# Patient Record
Sex: MALE | Race: OTHER | HISPANIC OR LATINO | Employment: FULL TIME | URBAN - METROPOLITAN AREA
[De-identification: names, ages, dates, MRNs, and addresses within clinical notes are randomized per-mention and may not be internally consistent; named-entity substitution may affect disease eponyms.]

---

## 2018-07-04 NOTE — PROGRESS NOTES
7/5/2018    [id number] [address]y   1955  [id number]    Discussion and Plan    Etiology of hematospermia discussed at length which is typically a benign and self-limiting condition  Examination is otherwise unremarkable  He will return in 1 year with PSA prior to visit  All questions answered at this time  1  Hematospermia  - PSA Total, Diagnostic; Future    Assessment      Patient Active Problem List   Diagnosis    Hematospermia       History of Present Illness    Surinder Bartholomew is a 65 y o  male seen today in regards to a history of hematospermia  He had a single episode following relations of a brownish discolored semen which had persisted for short time  No prior such episodes  He denies any urinary complaints noting good flow with complete bladder emptying and nocturia times 1-2  No history of hematuria urinary tract infection  The patient is an avid cyclist   States PSAs otherwise been normal previously  I have no records available for review  Urinary Symptom Assessment        Past Medical History  Past Medical History:   Diagnosis Date    Herniated disc, cervical        Past Social History  History reviewed  No pertinent surgical history  Past Family History  Family History   Problem Relation Age of Onset    Diabetes Father        Past Social history  Social History     Social History    Marital status: /Civil Union     Spouse name: N/A    Number of children: N/A    Years of education: N/A     Occupational History          Social History Main Topics    Smoking status: Former Smoker    Smokeless tobacco: Never Used    Alcohol use No    Drug use: No    Sexual activity: Not on file     Other Topics Concern    Not on file     Social History Narrative    No narrative on file       Current Medications  No current outpatient prescriptions on file  No current facility-administered medications for this visit          Allergies  Allergies   Allergen Reactions    Clotrimazole Rash Past Medical History, Social History, Family History, medications and allergies were reviewed  Review of Systems  Review of Systems   Constitutional: Negative  HENT: Negative  Eyes: Negative  Respiratory: Negative  Cardiovascular: Negative  Gastrointestinal: Negative  Endocrine: Negative  Genitourinary: Negative for decreased urine volume, difficulty urinating, frequency, hematuria and urgency  Musculoskeletal: Negative  Skin: Negative  Neurological: Negative  Hematological: Negative  Psychiatric/Behavioral: Negative  Vitals  Vitals:    07/05/18 1259   BP: 120/90   Pulse: 80   Weight: 88 9 kg (196 lb)   Height: 5' 8" (1 727 m)         Physical Exam    Physical Exam   Constitutional: He is oriented to person, place, and time  He appears well-developed and well-nourished  HENT:   Head: Normocephalic and atraumatic  Eyes: Pupils are equal, round, and reactive to light  Neck: Normal range of motion  Cardiovascular: Normal rate, regular rhythm and normal heart sounds  Pulmonary/Chest: Effort normal and breath sounds normal  No accessory muscle usage  No respiratory distress  Abdominal: Soft  Normal appearance and bowel sounds are normal  There is no tenderness  Genitourinary: Rectum normal, prostate normal and penis normal  No penile tenderness  Genitourinary Comments: Prostate approximately 40 g and smooth  No nodules   Musculoskeletal: Normal range of motion  Neurological: He is alert and oriented to person, place, and time  Skin: Skin is warm, dry and intact  Psychiatric: He has a normal mood and affect  His speech is normal  Cognition and memory are normal    Nursing note and vitals reviewed        Results    Below listed labs, pathology results, and radiology images were personally reviewed:    No results found for: PSA  No results found for: GLUCOSE, CALCIUM, NA, K, CO2, CL, BUN, CREATININE  No results found for: WBC, HGB, HCT, MCV, PLT    No results found for this or any previous visit (from the past 1 hour(s)) ]

## 2018-07-05 ENCOUNTER — OFFICE VISIT (OUTPATIENT)
Dept: UROLOGY | Facility: AMBULATORY SURGERY CENTER | Age: 63
End: 2018-07-05
Payer: COMMERCIAL

## 2018-07-05 VITALS
SYSTOLIC BLOOD PRESSURE: 120 MMHG | HEIGHT: 68 IN | HEART RATE: 80 BPM | BODY MASS INDEX: 29.7 KG/M2 | WEIGHT: 196 LBS | DIASTOLIC BLOOD PRESSURE: 90 MMHG

## 2018-07-05 DIAGNOSIS — R36.1 HEMATOSPERMIA: Primary | ICD-10-CM

## 2018-07-05 PROCEDURE — 99243 OFF/OP CNSLTJ NEW/EST LOW 30: CPT | Performed by: UROLOGY

## 2019-03-07 ENCOUNTER — TELEPHONE (OUTPATIENT)
Dept: UROLOGY | Facility: AMBULATORY SURGERY CENTER | Age: 64
End: 2019-03-07

## 2019-03-07 NOTE — TELEPHONE ENCOUNTER
Patient is experiencing hematospermia again and would like a call back to discuss or possibly get an emergency appointment  Please call

## 2019-03-07 NOTE — TELEPHONE ENCOUNTER
Spoke with patient and offered to schedule him with OLESYA Velasquez on 3/11/19  Patient refuses to see anyone other than physician  Attempted to explain to patient hematospermia is usually a benign and self-limiting condition as Dr Desirae Shepard explained to patient also last year, but patient still insisting on appt with Dr Dorie Cardona patient will have to look at schedule and call him back  It may be several weeks before he can see Dr Desirae Shepard  Patient will await call back

## 2019-03-07 NOTE — TELEPHONE ENCOUNTER
Called patient back to offer appt with Dr Nissa Santamaria 4/15/19 8:45 @ [place] office  Patient states that is too long to wait  Advised patient he was offered a sooner appt with [facility] but he refused  Attempted to explain to patient again that [facility] sees patients and treats them in office as MD are tied up in the OR most of the time  Patient accepted appt with Dr Nissa Santamaria 4/15 and will call back if he gets in sooner elsewhere

## 2019-03-14 ENCOUNTER — TRANSCRIBE ORDERS (OUTPATIENT)
Dept: LAB | Facility: CLINIC | Age: 64
End: 2019-03-14

## 2019-03-14 ENCOUNTER — APPOINTMENT (OUTPATIENT)
Dept: LAB | Facility: CLINIC | Age: 64
End: 2019-03-14
Payer: COMMERCIAL

## 2019-03-14 ENCOUNTER — OFFICE VISIT (OUTPATIENT)
Dept: LAB | Facility: CLINIC | Age: 64
End: 2019-03-14
Payer: COMMERCIAL

## 2019-03-14 DIAGNOSIS — M75.101 ROTATOR CUFF SYNDROME OF RIGHT SHOULDER: Primary | ICD-10-CM

## 2019-03-14 DIAGNOSIS — M75.101 ROTATOR CUFF SYNDROME OF RIGHT SHOULDER: ICD-10-CM

## 2019-03-14 LAB
ANION GAP SERPL CALCULATED.3IONS-SCNC: 9 MMOL/L (ref 4–13)
BASOPHILS # BLD AUTO: 0.05 THOUSANDS/ΜL (ref 0–0.1)
BASOPHILS NFR BLD AUTO: 1 % (ref 0–1)
BUN SERPL-MCNC: 20 MG/DL (ref 5–25)
CALCIUM SERPL-MCNC: 8.9 MG/DL (ref 8.3–10.1)
CHLORIDE SERPL-SCNC: 105 MMOL/L (ref 100–108)
CO2 SERPL-SCNC: 27 MMOL/L (ref 21–32)
CREAT SERPL-MCNC: 1.23 MG/DL (ref 0.6–1.3)
EOSINOPHIL # BLD AUTO: 0.13 THOUSAND/ΜL (ref 0–0.61)
EOSINOPHIL NFR BLD AUTO: 2 % (ref 0–6)
ERYTHROCYTE [DISTWIDTH] IN BLOOD BY AUTOMATED COUNT: 12.9 % (ref 11.6–15.1)
GFR SERPL CREATININE-BSD FRML MDRD: 63 ML/MIN/1.73SQ M
GLUCOSE SERPL-MCNC: 91 MG/DL (ref 65–140)
HCT VFR BLD AUTO: 45.1 % (ref 36.5–49.3)
HGB BLD-MCNC: 15.7 G/DL (ref 12–17)
IMM GRANULOCYTES # BLD AUTO: 0.02 THOUSAND/UL (ref 0–0.2)
IMM GRANULOCYTES NFR BLD AUTO: 0 % (ref 0–2)
LYMPHOCYTES # BLD AUTO: 1.96 THOUSANDS/ΜL (ref 0.6–4.47)
LYMPHOCYTES NFR BLD AUTO: 31 % (ref 14–44)
MCH RBC QN AUTO: 29.9 PG (ref 26.8–34.3)
MCHC RBC AUTO-ENTMCNC: 34.8 G/DL (ref 31.4–37.4)
MCV RBC AUTO: 86 FL (ref 82–98)
MONOCYTES # BLD AUTO: 0.45 THOUSAND/ΜL (ref 0.17–1.22)
MONOCYTES NFR BLD AUTO: 7 % (ref 4–12)
NEUTROPHILS # BLD AUTO: 3.65 THOUSANDS/ΜL (ref 1.85–7.62)
NEUTS SEG NFR BLD AUTO: 59 % (ref 43–75)
NRBC BLD AUTO-RTO: 0 /100 WBCS
PLATELET # BLD AUTO: 231 THOUSANDS/UL (ref 149–390)
PMV BLD AUTO: 10 FL (ref 8.9–12.7)
POTASSIUM SERPL-SCNC: 4 MMOL/L (ref 3.5–5.3)
RBC # BLD AUTO: 5.25 MILLION/UL (ref 3.88–5.62)
SODIUM SERPL-SCNC: 141 MMOL/L (ref 136–145)
WBC # BLD AUTO: 6.26 THOUSAND/UL (ref 4.31–10.16)

## 2019-03-14 PROCEDURE — 85025 COMPLETE CBC W/AUTO DIFF WBC: CPT

## 2019-03-14 PROCEDURE — 36415 COLL VENOUS BLD VENIPUNCTURE: CPT

## 2019-03-14 PROCEDURE — 93005 ELECTROCARDIOGRAM TRACING: CPT

## 2019-03-14 PROCEDURE — 80048 BASIC METABOLIC PNL TOTAL CA: CPT

## 2019-03-15 LAB
ATRIAL RATE: 68 BPM
P AXIS: 13 DEGREES
PR INTERVAL: 138 MS
QRS AXIS: -1 DEGREES
QRSD INTERVAL: 82 MS
QT INTERVAL: 402 MS
QTC INTERVAL: 427 MS
T WAVE AXIS: 37 DEGREES
VENTRICULAR RATE: 68 BPM

## 2019-03-15 PROCEDURE — 93010 ELECTROCARDIOGRAM REPORT: CPT | Performed by: INTERNAL MEDICINE

## 2019-04-15 ENCOUNTER — OFFICE VISIT (OUTPATIENT)
Dept: UROLOGY | Facility: AMBULATORY SURGERY CENTER | Age: 64
End: 2019-04-15
Payer: COMMERCIAL

## 2019-04-15 VITALS
HEIGHT: 68 IN | HEART RATE: 81 BPM | BODY MASS INDEX: 29.7 KG/M2 | DIASTOLIC BLOOD PRESSURE: 80 MMHG | WEIGHT: 196 LBS | SYSTOLIC BLOOD PRESSURE: 134 MMHG

## 2019-04-15 DIAGNOSIS — R36.1 HEMATOSPERMIA: Primary | ICD-10-CM

## 2019-04-15 PROCEDURE — 99214 OFFICE O/P EST MOD 30 MIN: CPT | Performed by: UROLOGY

## 2019-04-17 ENCOUNTER — APPOINTMENT (OUTPATIENT)
Dept: LAB | Facility: CLINIC | Age: 64
End: 2019-04-17
Payer: COMMERCIAL

## 2019-04-17 DIAGNOSIS — R36.1 HEMATOSPERMIA: ICD-10-CM

## 2019-04-17 LAB — PSA SERPL-MCNC: 0.3 NG/ML (ref 0–4)

## 2019-04-17 PROCEDURE — 84153 ASSAY OF PSA TOTAL: CPT

## 2020-04-09 ENCOUNTER — TELEPHONE (OUTPATIENT)
Dept: UROLOGY | Facility: AMBULATORY SURGERY CENTER | Age: 65
End: 2020-04-09

## 2023-06-30 ENCOUNTER — APPOINTMENT (EMERGENCY)
Dept: RADIOLOGY | Facility: HOSPITAL | Age: 68
End: 2023-06-30
Payer: COMMERCIAL

## 2023-06-30 ENCOUNTER — HOSPITAL ENCOUNTER (EMERGENCY)
Facility: HOSPITAL | Age: 68
Discharge: HOME | End: 2023-06-30
Attending: EMERGENCY MEDICINE
Payer: COMMERCIAL

## 2023-06-30 VITALS
OXYGEN SATURATION: 95 % | TEMPERATURE: 98.3 F | HEART RATE: 71 BPM | RESPIRATION RATE: 19 BRPM | SYSTOLIC BLOOD PRESSURE: 129 MMHG | DIASTOLIC BLOOD PRESSURE: 77 MMHG

## 2023-06-30 DIAGNOSIS — R00.2 PALPITATIONS: ICD-10-CM

## 2023-06-30 DIAGNOSIS — R42 DIZZINESS: Primary | ICD-10-CM

## 2023-06-30 LAB
ALBUMIN SERPL BCP-MCNC: 4.4 G/DL (ref 3.5–5)
ALP SERPL-CCNC: 57 U/L (ref 34–104)
ALT SERPL W P-5'-P-CCNC: 19 U/L (ref 7–52)
ANION GAP SERPL CALCULATED.3IONS-SCNC: 9 MMOL/L
AST SERPL W P-5'-P-CCNC: 16 U/L (ref 13–39)
ATRIAL RATE: 86 BPM
BASOPHILS # BLD AUTO: 0.04 THOUSANDS/ÂΜL (ref 0–0.1)
BASOPHILS NFR BLD AUTO: 1 % (ref 0–1)
BILIRUB SERPL-MCNC: 0.7 MG/DL (ref 0.2–1)
BUN SERPL-MCNC: 19 MG/DL (ref 5–25)
CALCIUM SERPL-MCNC: 9.3 MG/DL (ref 8.4–10.2)
CARDIAC TROPONIN I PNL SERPL HS: 2 NG/L
CHLORIDE SERPL-SCNC: 106 MMOL/L (ref 96–108)
CO2 SERPL-SCNC: 25 MMOL/L (ref 21–32)
CREAT SERPL-MCNC: 1.02 MG/DL (ref 0.6–1.3)
D DIMER PPP FEU-MCNC: <0.27 UG/ML FEU
EOSINOPHIL # BLD AUTO: 0.03 THOUSAND/ÂΜL (ref 0–0.61)
EOSINOPHIL NFR BLD AUTO: 0 % (ref 0–6)
ERYTHROCYTE [DISTWIDTH] IN BLOOD BY AUTOMATED COUNT: 12.3 % (ref 11.6–15.1)
GFR SERPL CREATININE-BSD FRML MDRD: 76 ML/MIN/1.73SQ M
GLUCOSE SERPL-MCNC: 133 MG/DL (ref 65–140)
HCT VFR BLD AUTO: 46.9 % (ref 36.5–49.3)
HGB BLD-MCNC: 16.7 G/DL (ref 12–17)
IMM GRANULOCYTES # BLD AUTO: 0.04 THOUSAND/UL (ref 0–0.2)
IMM GRANULOCYTES NFR BLD AUTO: 1 % (ref 0–2)
LYMPHOCYTES # BLD AUTO: 1.14 THOUSANDS/ÂΜL (ref 0.6–4.47)
LYMPHOCYTES NFR BLD AUTO: 13 % (ref 14–44)
MAGNESIUM SERPL-MCNC: 2 MG/DL (ref 1.9–2.7)
MCH RBC QN AUTO: 30.4 PG (ref 26.8–34.3)
MCHC RBC AUTO-ENTMCNC: 35.6 G/DL (ref 31.4–37.4)
MCV RBC AUTO: 85 FL (ref 82–98)
MONOCYTES # BLD AUTO: 0.37 THOUSAND/ÂΜL (ref 0.17–1.22)
MONOCYTES NFR BLD AUTO: 4 % (ref 4–12)
NEUTROPHILS # BLD AUTO: 6.87 THOUSANDS/ÂΜL (ref 1.85–7.62)
NEUTS SEG NFR BLD AUTO: 81 % (ref 43–75)
NRBC BLD AUTO-RTO: 0 /100 WBCS
P AXIS: 50 DEGREES
PLATELET # BLD AUTO: 206 THOUSANDS/UL (ref 149–390)
PMV BLD AUTO: 9.7 FL (ref 8.9–12.7)
POTASSIUM SERPL-SCNC: 3.8 MMOL/L (ref 3.5–5.3)
PR INTERVAL: 160 MS
PROT SERPL-MCNC: 7 G/DL (ref 6.4–8.4)
QRS AXIS: 15 DEGREES
QRSD INTERVAL: 90 MS
QT INTERVAL: 404 MS
QTC INTERVAL: 483 MS
RBC # BLD AUTO: 5.5 MILLION/UL (ref 3.88–5.62)
SODIUM SERPL-SCNC: 140 MMOL/L (ref 135–147)
T WAVE AXIS: 55 DEGREES
TSH SERPL DL<=0.05 MIU/L-ACNC: 1.43 UIU/ML (ref 0.45–4.5)
VENTRICULAR RATE: 86 BPM
WBC # BLD AUTO: 8.49 THOUSAND/UL (ref 4.31–10.16)

## 2023-06-30 PROCEDURE — 71046 X-RAY EXAM CHEST 2 VIEWS: CPT

## 2023-06-30 PROCEDURE — 85025 COMPLETE CBC W/AUTO DIFF WBC: CPT

## 2023-06-30 PROCEDURE — 99285 EMERGENCY DEPT VISIT HI MDM: CPT

## 2023-06-30 PROCEDURE — 83735 ASSAY OF MAGNESIUM: CPT

## 2023-06-30 PROCEDURE — 99285 EMERGENCY DEPT VISIT HI MDM: CPT | Performed by: EMERGENCY MEDICINE

## 2023-06-30 PROCEDURE — 84484 ASSAY OF TROPONIN QUANT: CPT

## 2023-06-30 PROCEDURE — 36415 COLL VENOUS BLD VENIPUNCTURE: CPT

## 2023-06-30 PROCEDURE — 84443 ASSAY THYROID STIM HORMONE: CPT

## 2023-06-30 PROCEDURE — 93005 ELECTROCARDIOGRAM TRACING: CPT

## 2023-06-30 PROCEDURE — 80053 COMPREHEN METABOLIC PANEL: CPT

## 2023-06-30 PROCEDURE — 85379 FIBRIN DEGRADATION QUANT: CPT

## 2023-06-30 RX ORDER — MECLIZINE HYDROCHLORIDE 25 MG/1
25 TABLET ORAL 3 TIMES DAILY PRN
Qty: 30 TABLET | Refills: 0 | Status: SHIPPED | OUTPATIENT
Start: 2023-06-30 | End: 2023-07-10

## 2023-06-30 RX ORDER — MECLIZINE HCL 12.5 MG/1
25 TABLET ORAL ONCE
Status: COMPLETED | OUTPATIENT
Start: 2023-06-30 | End: 2023-06-30

## 2023-06-30 RX ADMIN — MECLIZINE 25 MG: 12.5 TABLET ORAL at 12:30

## 2023-06-30 NOTE — ED ATTENDING ATTESTATION
6/30/2023  IJocelyne DO, saw and evaluated the patient  I have discussed the patient with the resident/non-physician practitioner and agree with the resident's/non-physician practitioner's findings, Plan of Care, and MDM as documented in the resident's/non-physician practitioner's note, except where noted  All available labs and Radiology studies were reviewed  I was present for key portions of any procedure(s) performed by the resident/non-physician practitioner and I was immediately available to provide assistance  At this point I agree with the current assessment done in the Emergency Department  I have conducted an independent evaluation of this patient a history and physical is as follows:    ED Course   78year old male presents for evaluation of 2 episodes of dizziness, diaphoresis, and rapid heart rate  Patient reports room spinning type dizziness shortly after waking up 5 days ago - it was associated with diaphoresis, nausea, feeling off balance and rapid heart rate and felt it was difficult to get a breath  No chest pain but felt fluttering  He had a second episode this am lasting a short time  He reports that movement of his head aggravates the symptoms  He also felt slightly off balance when ambulating  He has felt a sensation of head pressure this week  He does report a remote history of vertigo like symptoms and recalls feeling similar many years ago  PmhX: chronic tinnitus, remote hx of vertigo, hermatospermia    PE: Vital signs are stable, afebrile, no diaphoresis noted  Pupils are equal and reactive  There is fatigable left and right lateral nystagmus  TMs are clear bilateral   Neck is supple, nontender, normal range of motion  Heart is regular rate and rhythm without ectopy  Lungs are clear to auscultation  Abdomen is soft, nontender, nondistended with normal active bowel sounds  No lower extremity edema noted  5-5 strength in all 4 extremities    Gait steady    Assessment: 79-year-old male presents with recurrent episode of dizziness described as room spinning, rapid palpitations and diaphoresis  Differential diagnosis includes but is not limited to acute benign positional vertigo, cardiac dysrhythmia, myocardial ischemia or infarction, electrolyte abnormality, thyroid disorder    Plan: We will check EKG, electrolytes, trial Antivert for symptom relief, ambulate patient and reassess  Update: Patient's work-up is unremarkable and reassuring  He feels better after receiving Antivert and was able to ambulate without difficulty  Suspect vertigo as etiology of symptoms  Will recommend that he follow-up with cardiology for evaluation of rapid palpitations  We discussed signs and symptoms to return to the emergency department      Critical Care Time  Procedures

## 2023-06-30 NOTE — ED PROVIDER NOTES
History  Chief Complaint   Patient presents with   • Dizziness     On Monday, had episode of dizziness and diaphoresis  Resolved spontaneously  Today woke up and had same sensation of dizziness, sweating, palpations  -CP  Also complaining of bitter taste in mouth  This is a 42-year-old male presenting to the emergency department for evaluation of diaphoresis, dizziness, palpitations  Patient reports he has a history of vertigo in the past   He used to be active approximately 2 years ago but stopped  Patient reports that on Monday, 4 days ago patient woke up diaphoretic, dizzy, with palpitations  He states he for felt like his heart was going very fast and potentially skipping beats  He denies having this in the past though, as previously mentioned, he does have a history of vertigo  He states this went away shortly thereafter and he was able to resume his activities of daily living  He went without event Tuesday, Wednesday, Thursday  Today patient woke up and he had another episode of dizziness, diaphoresis, palpitations  This episode terminated shortly after beginning and given that this was the second episode in a week patient is presenting for further evaluation  None       Past Medical History:   Diagnosis Date   • Herniated disc, cervical        Past Surgical History:   Procedure Laterality Date   • SHOULDER SURGERY         Family History   Problem Relation Age of Onset   • Diabetes Father      I have reviewed and agree with the history as documented  E-Cigarette/Vaping   • E-Cigarette Use Never User      E-Cigarette/Vaping Substances     Social History     Tobacco Use   • Smoking status: Former   • Smokeless tobacco: Never   Vaping Use   • Vaping Use: Never used   Substance Use Topics   • Alcohol use: Not Currently     Comment: beer on occ   • Drug use: No        Review of Systems   Constitutional: Positive for diaphoresis  HENT: Negative  Eyes: Negative  Respiratory: Negative  Cardiovascular: Positive for palpitations  Gastrointestinal: Negative  Endocrine: Negative  Genitourinary: Negative  Musculoskeletal: Negative  Skin: Negative  Allergic/Immunologic: Negative  Neurological: Positive for dizziness  Hematological: Negative  Psychiatric/Behavioral: Negative  All other systems reviewed and are negative  Physical Exam  ED Triage Vitals   Temperature Pulse Respirations Blood Pressure SpO2   06/30/23 1112 06/30/23 1105 06/30/23 1105 06/30/23 1105 06/30/23 1105   98 3 °F (36 8 °C) 82 18 (!) 171/92 94 %      Temp Source Heart Rate Source Patient Position - Orthostatic VS BP Location FiO2 (%)   06/30/23 1112 06/30/23 1105 06/30/23 1200 06/30/23 1200 --   Oral Monitor Lying Right arm       Pain Score       --                    Orthostatic Vital Signs  Vitals:    06/30/23 1105 06/30/23 1200 06/30/23 1230 06/30/23 1300   BP: (!) 171/92 138/81 134/81 129/77   Pulse: 82 79 76 71   Patient Position - Orthostatic VS:  Lying Lying        Physical Exam  Vitals and nursing note reviewed  Constitutional:       General: He is not in acute distress  Appearance: Normal appearance  He is not ill-appearing, toxic-appearing or diaphoretic  HENT:      Head: Normocephalic and atraumatic  Eyes:      General: No scleral icterus  Right eye: No discharge  Left eye: No discharge  Extraocular Movements: Extraocular movements intact  Conjunctiva/sclera: Conjunctivae normal       Pupils: Pupils are equal, round, and reactive to light  Comments: Fatigable nystagmus with rapid leftward and rightward gaze   Cardiovascular:      Rate and Rhythm: Normal rate  Pulses: Normal pulses  Heart sounds: Normal heart sounds  No murmur heard  No friction rub  No gallop  Pulmonary:      Effort: Pulmonary effort is normal  No respiratory distress  Breath sounds: Normal breath sounds  No stridor  No wheezing, rhonchi or rales     Abdominal: General: Abdomen is flat  Bowel sounds are normal  There is no distension  Palpations: Abdomen is soft  Tenderness: There is no abdominal tenderness  There is no guarding or rebound  Musculoskeletal:         General: No swelling  Normal range of motion  Cervical back: Normal range of motion  No rigidity  Right lower leg: No edema  Left lower leg: No edema  Skin:     General: Skin is warm and dry  Capillary Refill: Capillary refill takes less than 2 seconds  Coloration: Skin is not jaundiced  Findings: No bruising or lesion  Neurological:      General: No focal deficit present  Mental Status: He is alert and oriented to person, place, and time  Mental status is at baseline  Comments: Patient ambulatory in emergency department   Psychiatric:         Mood and Affect: Mood normal          Behavior: Behavior normal          Thought Content: Thought content normal          Judgment: Judgment normal          ED Medications  Medications   meclizine (ANTIVERT) tablet 25 mg (25 mg Oral Given 6/30/23 1230)       Diagnostic Studies  Results Reviewed     Procedure Component Value Units Date/Time    TSH, 3rd generation with Free T4 reflex [[id number]]  (Normal) Collected: 06/30/23 1200    Lab Status: Final result Specimen: Blood from Arm, Left Updated: 06/30/23 1243     TSH 3RD GENERATON 1 426 uIU/mL     D-dimer, quantitative [[id number]]  (Normal) Collected: 06/30/23 1200    Lab Status: Final result Specimen: Blood from Arm, Left Updated: 06/30/23 1237     D-Dimer, Quant <0 27 ug/ml FEU     Narrative: In the evaluation for possible pulmonary embolism, in the appropriate (Well's Score of 4 or less) patient, the age adjusted d-dimer cutoff for this patient can be calculated as:    Age x 0 01 (in ug/mL) for Age-adjusted D-dimer exclusion threshold for a patient over 50 years      HS Troponin 0hr (reflex protocol) [[id number]]  (Normal) Collected: 06/30/23 1200    Lab Status: Final result Specimen: Blood from Arm, Left Updated: 06/30/23 1233     hs TnI 0hr 2 ng/L     Comprehensive metabolic panel [[id number]] Collected: 06/30/23 1200    Lab Status: Final result Specimen: Blood from Arm, Left Updated: 06/30/23 1226     Sodium 140 mmol/L      Potassium 3 8 mmol/L      Chloride 106 mmol/L      CO2 25 mmol/L      ANION GAP 9 mmol/L      BUN 19 mg/dL      Creatinine 1 02 mg/dL      Glucose 133 mg/dL      Calcium 9 3 mg/dL      AST 16 U/L      ALT 19 U/L      Alkaline Phosphatase 57 U/L      Total Protein 7 0 g/dL      Albumin 4 4 g/dL      Total Bilirubin 0 70 mg/dL      eGFR 76 ml/min/1 73sq m     Narrative:      [place] guidelines for Chronic Kidney Disease (CKD):   •  Stage 1 with normal or high GFR (GFR > 90 mL/min/1 73 square meters)  •  Stage 2 Mild CKD (GFR = 60-89 mL/min/1 73 square meters)  •  Stage 3A Moderate CKD (GFR = 45-59 mL/min/1 73 square meters)  •  Stage 3B Moderate CKD (GFR = 30-44 mL/min/1 73 square meters)  •  Stage 4 Severe CKD (GFR = 15-29 mL/min/1 73 square meters)  •  Stage 5 End Stage CKD (GFR <15 mL/min/1 73 square meters)  Note: GFR calculation is accurate only with a steady state creatinine    Magnesium [[id number]]  (Normal) Collected: 06/30/23 1200    Lab Status: Final result Specimen: Blood from Arm, Left Updated: 06/30/23 1226     Magnesium 2 0 mg/dL     CBC and differential [[id number]]  (Abnormal) Collected: 06/30/23 1200    Lab Status: Final result Specimen: Blood from Arm, Left Updated: 06/30/23 1212     WBC 8 49 Thousand/uL      RBC 5 50 Million/uL      Hemoglobin 16 7 g/dL      Hematocrit 46 9 %      MCV 85 fL      MCH 30 4 pg      MCHC 35 6 g/dL      RDW 12 3 %      MPV 9 7 fL      Platelets 391 Thousands/uL      nRBC 0 /100 WBCs      Neutrophils Relative 81 %      Immat GRANS % 1 %      Lymphocytes Relative 13 %      Monocytes Relative 4 %      Eosinophils Relative 0 %      Basophils Relative 1 %      Neutrophils "Absolute 6 87 Thousands/µL      Immature Grans Absolute 0 04 Thousand/uL      Lymphocytes Absolute 1 14 Thousands/µL      Monocytes Absolute 0 37 Thousand/µL      Eosinophils Absolute 0 03 Thousand/µL      Basophils Absolute 0 04 Thousands/µL                  XR chest 2 views   ED Interpretation by Jl No DO (06/30 1149)   No infiltrate, effusion, pneumothorax      Final Result by Feliciano Gross MD (06/30 1159)      No acute cardiopulmonary disease  Workstation performed: [id number]               Procedures  ECG 12 Lead Documentation Only    Date/Time: 7/1/2023 9:38 PM    Performed by: Jl No DO  Authorized by: Jl No DO    Indications / Diagnosis:  Palpitations  ECG reviewed by me, the ED Provider: yes    Patient location:  ED  Previous ECG:     Previous ECG:  Unavailable  Interpretation:     Interpretation: normal    Rate:     ECG rate:  86    ECG rate assessment: normal    Rhythm:     Rhythm: sinus rhythm    Ectopy:     Ectopy: none    QRS:     QRS axis:  Normal  Conduction:     Conduction: normal    ST segments:     ST segments:  Normal  T waves:     T waves: normal    Other findings:     Other findings: prolonged qTc interval    Comments:      qTc 483          ED Course  ED Course as of 07/02/23 0408   Fri Jun 30, 2023   1147 Pt evaluated for dizziness/diaphoresis/SOB  Labs ordered  1149 XR chest 2 views  No infiltrate, effusion, pneumothorax   1220 ECG 12 lead  ECG without ischemic change  Normal waveforms, normal R wave progression  Qtc 483   1220 CBC and differential(!)  wnl   1238 hs TnI 0hr: 2   1238 D-Dimer, Quant: <0 27   1238 Magnesium: 2 0   1250 TSH 3RD GENERATON: 1 426   1418 Pt re-evaluated, feels \"85% better\"                                       Medical Decision Making  This is a 44-year-old male presenting to the emergency department for evaluation of diaphoresis, dizziness, palpitations    No further episodes in " the emergency department  Initial physical exam is unremarkable apart from some fatigable nystagmus and reproducible dizziness with quick rotation of head to the left or right  Patient given meclizine with resolution of his dizziness  Laboratory evaluation is unremarkable  EKG is within normal limits apart from QTc prolongation  No current medications causing QTc prolongation  At this point I believe patient is safe for discharge  Do not believe patient is having an acute cerebrovascular event  Do not believe patient needs any further imaging  Will recommend follow-up with cardiology, send prescription for meclizine  Patient discharged  Amount and/or Complexity of Data Reviewed  Labs: ordered  Decision-making details documented in ED Course  Radiology: ordered and independent interpretation performed  Decision-making details documented in ED Course  ECG/medicine tests:  Decision-making details documented in ED Course  Disposition  Final diagnoses:   Dizziness   Palpitations     Time reflects when diagnosis was documented in both MDM as applicable and the Disposition within this note     Time User Action Codes Description Comment    6/30/2023  2:22 PM Gilda Angelucci Add [R42] Dizziness     6/30/2023  2:22 PM Gilda Angelucci Add [R00 2] Palpitations       ED Disposition     ED Disposition   Discharge    Condition   Stable    Date/Time   Fri Jun 30, 2023  2:22 PM    [address] discharge to home/self care                 Follow-up Information     Follow up With Specialties Details Why Contact Info Additional Information    Abhinav Blankenship MD Family Medicine   [address] [facility] Schedule an appointment as soon as possible for a visit in 2 days  [phone] [facility] [place], [place], [address]          Discharge Medication List as of 6/30/2023  2:25 PM      START taking these medications    Details   meclizine (ANTIVERT) 25 mg tablet Take 1 tablet (25 mg total) by mouth 3 (three) times a day as needed for dizziness for up to 10 days, Starting Fri 6/30/2023, Until Mon 7/10/2023 at 2359, Normal               PDMP Review     None           ED Provider  Attending physically available and evaluated Jo Rodrigues I managed the patient along with the ED Attending      Electronically Signed by         Bren Lane DO  07/01/23 8740       Bren Lane DO  07/02/23 2885

## 2023-07-04 LAB
ATRIAL RATE: 86 BPM
P AXIS: 50 DEGREES
PR INTERVAL: 160 MS
QRS AXIS: 15 DEGREES
QRSD INTERVAL: 90 MS
QT INTERVAL: 404 MS
QTC INTERVAL: 483 MS
T WAVE AXIS: 55 DEGREES
VENTRICULAR RATE: 86 BPM

## 2023-07-04 PROCEDURE — 93010 ELECTROCARDIOGRAM REPORT: CPT | Performed by: INTERNAL MEDICINE

## 2023-08-26 NOTE — PROGRESS NOTES
Cardiology Clinic Note    Isabel Kang 78 y.o. male   MRN: [id number]  Encounter: [id number]        Assessment / Plan:    # Palpitations  TSH was normal  EKG shows sinus rhythm  check ziopatch (symptoms too infrequent so holter would be low yield)  F/u after monitor. Consider echo if appropriate. Ultimately, if findings on the heart monitor, could consider beta-blocker    # Elevated BP reading without dx of HTN  Reports nervous rushing in here  Last BP not elevated. Will continue to monitor. # Borderline prolonged QTc in ER  In ED, the QTc was 460ms on my measurement  Previously normal in 2019  Repeat EKG today, normal QTc measurement    # Mild AI  On echo 2014  Exam - no murmur appreciated. F/u after monitor. Ultimately, may repeat echo to evaluate for progression. Today's Plan Summary:  See above assessment/plan for full details of today's plan. Briefly,     Check ziopatch. Reason For Visit / Chief Complaint:  Referred by Dr. Rosealee Dancer ([facility]) for a new patient visit for palpitations. HPI:   Isabel Kang is a 78 y.o.  male with history as noted in the problem list and further detailed in the above assessment and plan. Referred by Dr. Rosealee Dancer ([facility]) for a new patient visit for palpitations. The patient went to ER in June -- had episode of dizziness, sweating, palpitations. felt like his heart was going very fast and potentially skipping beats. In ED - CXR normal, EKG sinus rhythm with borderline QTc, troponin neg. Patient had reproducible dizziness with quick rotation of head to the left or right (and had hx of vertigo in the past). Patient given meclizine with resolution of his dizziness. DC'd to f/u with cardiology given the palpitations episodes. Today, reports he does get palpitations sometimes. Rare sx's - every few weeks. No syncope. Gets atypical chest pain - rarely occurs - only lasts 2 seconds. No SOB. No significant CHANDLER. No edema. No orthopnea or PND. Sometimes dizziness with standing up quickly. 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 . 4 children. Nonsmoker (quit 37 years ago). Very rare ETOH. No drugs. Cardiac Imaging personally reviewed:  EKG 6-30-23  Sinus rhythm. QTc 460ms. 8-28-23  NSR. QTc 439ms. Holter or event monitor    Echo 2014  EF 60%. RV mildly dilated. Mild AI         JOVANNY    Cardiac MRI    Stress testing    Coronary CTA or [facility]    CPET              Patient Active Problem List    Diagnosis Date Noted   • Vertigo 08/28/2023   • Palpitations 08/28/2023   • Hematospermia 07/05/2018       Past Medical History:   Diagnosis Date   • Herniated disc, cervical    • Vertigo 8/28/2023       Review of Systems   Constitutional: Negative for chills and fever. HENT: Negative for nosebleeds. Gastrointestinal: Negative for abdominal distention and blood in stool. Neurological: Positive for dizziness. Negative for syncope. Psychiatric/Behavioral: Negative for confusion. 14-point ROS completed and negative except as stated above and/or in the HPI.     Allergies   Allergen Reactions   • Clotrimazole Rash       Current Outpatient Medications   Medication Instructions   • meclizine (ANTIVERT) 25 mg, Oral, 3 times daily PRN       Social History     Socioeconomic History   • Marital status: /Civil Union     Spouse name: Not on file   • Number of children: Not on file   • Years of education: Not on file   • Highest education level: Not on file   Occupational History   • Occupation:    Tobacco Use   • Smoking status: Former   • Smokeless tobacco: Never   Vaping Use   • Vaping Use: Never used   Substance and Sexual Activity   • Alcohol use: Not Currently     Comment: beer on occ   • Drug use: No   • Sexual activity: Not on file   Other Topics Concern   • Not on file   Social History Narrative   • Not on file     Social Determinants of Health     Financial Resource Strain: Not on file   Food Insecurity: Not on file   Transportation Needs: Not on file   Physical Activity: Not on file   Stress: Not on file   Social Connections: Not on file   Intimate Partner Violence: Not on file   Housing Stability: Not on file       Family History   Problem Relation Age of Onset   • Diabetes Father        Physical Exam:  Blood pressure 146/90, pulse 67, resp. rate 18, height 5' 8" (1.727 m), weight 91.6 kg (202 lb), SpO2 98 %. Body mass index is 30.71 kg/m². Wt Readings from Last 3 Encounters:   08/28/23 91.6 kg (202 lb)   04/15/19 88.9 kg (196 lb)   07/05/18 88.9 kg (196 lb)     Physical Exam  Vitals reviewed. Constitutional:       General: He is not in acute distress. Appearance: He is not toxic-appearing. HENT:      Head: Normocephalic and atraumatic. Eyes:      General: No scleral icterus. Conjunctiva/sclera: Conjunctivae normal.   Neck:      Vascular: No carotid bruit. Comments: No JVP elevation  Cardiovascular:      Rate and Rhythm: Normal rate and regular rhythm. Heart sounds: No murmur heard. No friction rub. No gallop. Pulmonary:      Breath sounds: Normal breath sounds. No wheezing, rhonchi or rales. Abdominal:      General: There is no distension. Palpations: Abdomen is soft. Tenderness: There is no abdominal tenderness. There is no guarding. Musculoskeletal:      Right lower leg: No edema. Left lower leg: No edema. Skin:     Coloration: Skin is not jaundiced or pale. Findings: No erythema. Neurological:      Mental Status: He is alert. Mental status is at baseline.    Psychiatric:         Mood and Affect: Mood normal.         Behavior: Behavior normal.         Labs & Results:  Lab Results   Component Value Date    SODIUM 140 06/30/2023    K 3.8 06/30/2023     06/30/2023    CO2 25 06/30/2023    BUN 19 06/30/2023    CREATININE 1.02 06/30/2023    GLUC 133 06/30/2023    CALCIUM 9.3 06/30/2023 Thank you for the opportunity to participate in the care of this patient.     Doug Goodson MD, [facility] [place]  Advanced Heart Failure and Transplant Cardiologist  Director of [address] [place] [address]

## 2023-08-28 ENCOUNTER — CONSULT (OUTPATIENT)
Dept: CARDIOLOGY CLINIC | Facility: CLINIC | Age: 68
End: 2023-08-28
Payer: COMMERCIAL

## 2023-08-28 VITALS
DIASTOLIC BLOOD PRESSURE: 90 MMHG | OXYGEN SATURATION: 98 % | HEIGHT: 68 IN | SYSTOLIC BLOOD PRESSURE: 146 MMHG | RESPIRATION RATE: 18 BRPM | WEIGHT: 202 LBS | BODY MASS INDEX: 30.62 KG/M2 | HEART RATE: 67 BPM

## 2023-08-28 DIAGNOSIS — R42 DIZZINESS: ICD-10-CM

## 2023-08-28 DIAGNOSIS — R00.2 PALPITATION: Primary | ICD-10-CM

## 2023-08-28 DIAGNOSIS — I35.1 NONRHEUMATIC AORTIC VALVE INSUFFICIENCY: ICD-10-CM

## 2023-08-28 DIAGNOSIS — R94.31 PROLONGED Q-T INTERVAL ON ECG: ICD-10-CM

## 2023-08-28 DIAGNOSIS — R00.2 PALPITATIONS: ICD-10-CM

## 2023-08-28 DIAGNOSIS — R03.0 ELEVATED BP WITHOUT DIAGNOSIS OF HYPERTENSION: ICD-10-CM

## 2023-08-28 PROCEDURE — 99244 OFF/OP CNSLTJ NEW/EST MOD 40: CPT | Performed by: INTERNAL MEDICINE

## 2023-08-28 PROCEDURE — 93000 ELECTROCARDIOGRAM COMPLETE: CPT | Performed by: INTERNAL MEDICINE

## 2023-08-28 NOTE — LETTER
August 28, 2023     Reva Galdamez, [address]    Patient: Francine Taylor   YOB: 1955   Date of Visit: 8/28/2023       Dear Dr. Brian Edi: Thank you for referring [facility] to me for evaluation. Below are my notes for this consultation. If you have questions, please do not hesitate to call me. I look forward to following your patient along with you. Sincerely,        Angela Martinez MD        CC: MD Angela Pinedo MD  8/28/2023  8:48 AM  Sign when Signing Visit  Cardiology Clinic Note    Francine Taylor 78 y.o. male   MRN: [id number]  Encounter: [id number]        Assessment / Plan:    # Palpitations  • TSH was normal  • EKG shows sinus rhythm  • check ziopatch (symptoms too infrequent so holter would be low yield)  • F/u after monitor. Consider echo if appropriate. • Ultimately, if findings on the heart monitor, could consider beta-blocker    # Elevated BP reading without dx of HTN  · Reports nervous rushing in here  · Last BP not elevated. Will continue to monitor. # Borderline prolonged QTc in ER  • In ED, the QTc was 460ms on my measurement  • Previously normal in 2019  • Repeat EKG today, normal QTc measurement    # Mild AI  • On echo 2014  • Exam - no murmur appreciated. • F/u after monitor. Ultimately, may repeat echo to evaluate for progression. Today's Plan Summary:  See above assessment/plan for full details of today's plan. Briefly,     Check ziopatch. Reason For Visit / Chief Complaint:  Referred by Dr. Reva Galdamez ([facility] medicine) for a new patient visit for palpitations. HPI:   Francine Taylor is a 78 y.o.  male with history as noted in the problem list and further detailed in the above assessment and plan. Referred by Dr. Reva Galdamez ([facility] medicine) for a new patient visit for palpitations. The patient went to ER in June -- had episode of dizziness, sweating, palpitations.   felt like his heart was going very fast and potentially skipping beats. In ED - CXR normal, EKG sinus rhythm with borderline QTc, troponin neg. Patient had reproducible dizziness with quick rotation of head to the left or right (and had hx of vertigo in the past). Patient given meclizine with resolution of his dizziness. DC'd to f/u with cardiology given the palpitations episodes. Today, reports he does get palpitations sometimes. Rare sx's - every few weeks. No syncope. Gets atypical chest pain - rarely occurs - only lasts 2 seconds. No SOB. No significant CHANDLER. No edema. No orthopnea or PND. Sometimes dizziness with standing up quickly. 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 . 4 children. Nonsmoker (quit 37 years ago). Very rare ETOH. No drugs. Cardiac Imaging personally reviewed:  EKG 6-30-23  Sinus rhythm. QTc 460ms. 8-28-23  NSR. QTc 439ms. Holter or event monitor    Echo 2014  EF 60%. RV mildly dilated. Mild AI         JOVANNY    Cardiac MRI    Stress testing    [facility]    CP[facility]              Patient Active Problem List    Diagnosis Date Noted   • Vertigo 08/28/2023   • Hematospermia 07/05/2018       Past Medical History:   Diagnosis Date   • Herniated disc, cervical    • Vertigo 8/28/2023       Review of Systems   Constitutional: Negative for chills and fever. HENT: Negative for nosebleeds. Gastrointestinal: Negative for abdominal distention and blood in stool. Neurological: Positive for dizziness. Negative for syncope. Psychiatric/Behavioral: Negative for confusion. 14-point ROS completed and negative except as stated above and/or in the HPI.     Allergies   Allergen Reactions   • Clotrimazole Rash       Current Outpatient Medications   Medication Instructions   • meclizine (ANTIVERT) 25 mg, Oral, 3 times daily PRN       Social History     Socioeconomic History   • Marital status: /Civil Union Spouse name: Not on file   • Number of children: Not on file   • Years of education: Not on file   • Highest education level: Not on file   Occupational History   • Occupation:    Tobacco Use   • Smoking status: Former   • Smokeless tobacco: Never   Vaping Use   • Vaping Use: Never used   Substance and Sexual Activity   • Alcohol use: Not Currently     Comment: beer on occ   • Drug use: No   • Sexual activity: Not on file   Other Topics Concern   • Not on file   Social History Narrative   • Not on file     Social Determinants of Health     Financial Resource Strain: Not on file   Food Insecurity: Not on file   Transportation Needs: Not on file   Physical Activity: Not on file   Stress: Not on file   Social Connections: Not on file   Intimate Partner Violence: Not on file   Housing Stability: Not on file       Family History   Problem Relation Age of Onset   • Diabetes Father        Physical Exam:  Blood pressure 146/90, pulse 67, resp. rate 18, height 5' 8" (1.727 m), weight 91.6 kg (202 lb), SpO2 98 %. Body mass index is 30.71 kg/m². Wt Readings from Last 3 Encounters:   08/28/23 91.6 kg (202 lb)   04/15/19 88.9 kg (196 lb)   07/05/18 88.9 kg (196 lb)     Physical Exam  Vitals reviewed. Constitutional:       General: He is not in acute distress. Appearance: He is not toxic-appearing. HENT:      Head: Normocephalic and atraumatic. Eyes:      General: No scleral icterus. Conjunctiva/sclera: Conjunctivae normal.   Neck:      Vascular: No carotid bruit. Comments: No JVP elevation  Cardiovascular:      Rate and Rhythm: Normal rate and regular rhythm. Heart sounds: No murmur heard. No friction rub. No gallop. Pulmonary:      Breath sounds: Normal breath sounds. No wheezing, rhonchi or rales. Abdominal:      General: There is no distension. Palpations: Abdomen is soft. Tenderness: There is no abdominal tenderness. There is no guarding.    Musculoskeletal:      Right lower leg: No edema. Left lower leg: No edema. Skin:     Coloration: Skin is not jaundiced or pale. Findings: No erythema. Neurological:      Mental Status: He is alert. Mental status is at baseline. Psychiatric:         Mood and Affect: Mood normal.         Behavior: Behavior normal.         Labs & Results:  Lab Results   Component Value Date    SODIUM 140 06/30/2023    K 3.8 06/30/2023     06/30/2023    CO2 25 06/30/2023    BUN 19 06/30/2023    CREATININE 1.02 06/30/2023    GLUC 133 06/30/2023    CALCIUM 9.3 06/30/2023       Thank you for the opportunity to participate in the care of this patient.     Vikas Sierra MD, [facility]  Advanced Heart Failure and Transplant Cardiologist  Director of [address] [place] [address]

## 2023-08-30 ENCOUNTER — TELEPHONE (OUTPATIENT)
Dept: CARDIOLOGY CLINIC | Facility: CLINIC | Age: 68
End: 2023-08-30

## 2023-08-30 NOTE — TELEPHONE ENCOUNTER
No Authorization Required for AMB Extended Holter Monitor ([id number]) ([id number]) per Marito Muniz ([employer]) on 8/30/2023 at 3:07pm.  Ref #[id number]

## 2023-09-20 ENCOUNTER — CLINICAL SUPPORT (OUTPATIENT)
Dept: CARDIOLOGY CLINIC | Facility: CLINIC | Age: 68
End: 2023-09-20
Payer: COMMERCIAL

## 2023-09-20 DIAGNOSIS — R42 DIZZINESS: ICD-10-CM

## 2023-09-20 DIAGNOSIS — R00.2 PALPITATIONS: ICD-10-CM

## 2023-09-20 PROCEDURE — 93248 EXT ECG>7D<15D REV&INTERPJ: CPT | Performed by: INTERNAL MEDICINE

## 2023-09-20 NOTE — RESULT ENCOUNTER NOTE
Zio patch - Extended Holter Monitor Report  Analysis time (after artifact removed) - 13 days, 5 hours    Predominant underlying rhythm was Sinus Rhythm. min HR of 47 bpm, max HR of 149 bpm, and avg HR of 74 bpm.   Rare PAC's and PVC's (less than 1%)  2 brief atrial runs (longest 6 beats)    Paient reported symptoms correlated with sinus, PAC's, PVC's. Reviewed all rhythm strips personally.     Tushar Beth MD

## 2023-09-22 ENCOUNTER — TELEPHONE (OUTPATIENT)
Dept: CARDIOLOGY CLINIC | Facility: CLINIC | Age: 68
End: 2023-09-22

## 2023-09-22 NOTE — TELEPHONE ENCOUNTER
LVOM for patient regarding Zio results. Instructed pt to call back with any questions.      ----- Message from Kay Lr MD sent at 9/20/2023  6:00 PM EDT -----    Can you let the patient know that I reviewed the Zio patch and there are no major concerns on the test.  I look forward to seeing him at the next visit and discussing all the details.     Thanks  [facility]            ----- Message -----  From: Lon Singh MA  Sent: 9/20/2023  11:13 AM EDT  To: Kay Lr MD

## 2023-10-30 ENCOUNTER — OFFICE VISIT (OUTPATIENT)
Dept: CARDIOLOGY CLINIC | Facility: CLINIC | Age: 68
End: 2023-10-30
Payer: COMMERCIAL

## 2023-10-30 VITALS
OXYGEN SATURATION: 96 % | SYSTOLIC BLOOD PRESSURE: 136 MMHG | WEIGHT: 200 LBS | DIASTOLIC BLOOD PRESSURE: 78 MMHG | BODY MASS INDEX: 30.31 KG/M2 | HEIGHT: 68 IN | HEART RATE: 76 BPM

## 2023-10-30 DIAGNOSIS — I10 PRIMARY HYPERTENSION: ICD-10-CM

## 2023-10-30 DIAGNOSIS — I35.1 NONRHEUMATIC AORTIC VALVE INSUFFICIENCY: ICD-10-CM

## 2023-10-30 DIAGNOSIS — I49.3 PVC (PREMATURE VENTRICULAR CONTRACTION): ICD-10-CM

## 2023-10-30 DIAGNOSIS — R00.2 PALPITATIONS: Primary | ICD-10-CM

## 2023-10-30 DIAGNOSIS — I49.1 PAC (PREMATURE ATRIAL CONTRACTION): ICD-10-CM

## 2023-10-30 PROCEDURE — 99214 OFFICE O/P EST MOD 30 MIN: CPT | Performed by: INTERNAL MEDICINE

## 2023-10-30 RX ORDER — METOPROLOL SUCCINATE 25 MG/1
25 TABLET, EXTENDED RELEASE ORAL DAILY
Qty: 30 TABLET | Refills: 3 | Status: SHIPPED | OUTPATIENT
Start: 2023-10-30

## 2023-10-30 NOTE — PROGRESS NOTES
Cardiology Clinic Note    Saira Jesus 78 y.o. male   MRN: [id number]  Encounter: [id number]        Assessment / Plan:    # Palpitations  TSH was normal  EKG shows sinus rhythm  Ziopatch - rare PAC's and PVC's that correlated with sx's  Trial toprol    # HTN  Mild. Starting BB as above for palpitations. Hopefully we get some BP lowering effect of the beta blocker as well. # Borderline prolonged QTc in ER  In ED, the QTc was 460ms on my measurement  Previously normal in 2019  Repeat EKG last visit, normal QTc measurement. No events on ziopatch. # Mild AI  On echo 2014  Repeat echo to evaluate for progression. Today's Plan Summary:  See above assessment/plan for full details of today's plan. Briefly,     Trial toprol (for palpitations and for HTN)  Check echo to f/u on the AI          Reason For Visit / Chief Complaint:  F/u palpitations. HPI:   Saira Jesus is a 78 y.o.  male with history as noted in the problem list and further detailed in the above assessment and plan. Initial:  August 2023  Referred by Dr. Jerica Olmstead ([facility] medicine) for a new patient visit for palpitations. The patient went to ER in June -- had episode of dizziness, sweating, palpitations. felt like his heart was going very fast and potentially skipping beats. In ED - CXR normal, EKG sinus rhythm with borderline QTc, troponin neg. Patient had reproducible dizziness with quick rotation of head to the left or right (and had hx of vertigo in the past). Patient given meclizine with resolution of his dizziness. DC'd to f/u with cardiology given the palpitations episodes. Today, reports he does get palpitations sometimes. Rare sx's - every few weeks. No syncope. Gets atypical chest pain - rarely occurs - only lasts 2 seconds. 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 Interval:  Plan at first visit -->  Check ziopatch.     Zio patch  Predominant underlying rhythm was Sinus Rhythm. min HR of 47 bpm, max HR of 149 bpm, and avg HR of 74 bpm.   Rare PAC's and PVC's (less than 1%)  2 brief atrial runs (longest 6 beats)  Paient reported symptoms correlated with sinus, PAC's, PVC's. Today -  feels overall stable. Feels like heart "stops and restarts". No syncope. Has some fatigue but not sleeping well. Mild CHANDLER with stairs. Cardiac Imaging personally reviewed:  EKG 6-30-23  Sinus rhythm. QTc 460ms. 8-28-23  NSR. QTc 439ms. Holter or event monitor Zio patch - Sept 2023  Predominant underlying rhythm was Sinus Rhythm. min HR of 47 bpm, max HR of 149 bpm, and avg HR of 74 bpm.   Rare PAC's and PVC's (less than 1%)  2 brief atrial runs (longest 6 beats)  Paient reported symptoms correlated with sinus, PAC's, PVC's. Echo 2014  EF 60%. RV mildly dilated.   Mild AI         JOVANNY    Cardiac MRI    Stress testing    [facility]    CP[facility]              Patient Active Problem List    Diagnosis Date Noted   • Primary hypertension 10/30/2023   • PVC (premature ventricular contraction) 10/30/2023   • PAC (premature atrial contraction) 10/30/2023   • Vertigo 08/28/2023   • Palpitations 08/28/2023   • Hematospermia 07/05/2018       Past Medical History:   Diagnosis Date   • Herniated disc, cervical    • Vertigo 8/28/2023       Allergies   Allergen Reactions   • Clotrimazole Rash       Current Outpatient Medications   Medication Instructions   • meclizine (ANTIVERT) 25 mg, Oral, 3 times daily PRN   • metoprolol succinate (TOPROL-XL) 25 mg, Oral, Daily       Social History     Socioeconomic History   • Marital status: /Civil Union     Spouse name: Not on file   • Number of children: Not on file   • Years of education: Not on file   • Highest education level: Not on file   Occupational History   • Occupation:    Tobacco Use   • Smoking status: Former   • Smokeless tobacco: Never   Vaping Use   • Vaping Use: Never used   Substance and Sexual Activity   • Alcohol use: Not Currently     Comment: beer on occ   • Drug use: No   • Sexual activity: Not on file   Other Topics Concern   • Not on file   Social History Narrative   • Not on file     Social Determinants of Health     Financial Resource Strain: Not on file   Food Insecurity: Not on file   Transportation Needs: Not on file   Physical Activity: Not on file   Stress: Not on file   Social Connections: Not on file   Intimate Partner Violence: Not on file   Housing Stability: Not on file       Family History   Problem Relation Age of Onset   • Diabetes Father        Physical Exam:  Blood pressure 136/78, pulse 76, height 5' 8" (1.727 m), weight 90.7 kg (200 lb), SpO2 96 %. Body mass index is 30.41 kg/m². Wt Readings from Last 3 Encounters:   10/30/23 90.7 kg (200 lb)   08/28/23 91.6 kg (202 lb)   04/15/19 88.9 kg (196 lb)     Physical Exam  Vitals reviewed. Constitutional:       General: He is not in acute distress. Appearance: He is not toxic-appearing. Neck:      Vascular: No carotid bruit. Comments: No JVP elevation  Cardiovascular:      Rate and Rhythm: Normal rate and regular rhythm. Heart sounds: No murmur heard. No friction rub. No gallop. Pulmonary:      Breath sounds: Normal breath sounds. No wheezing, rhonchi or rales. Musculoskeletal:      Comments: No LE edema   Neurological:      Mental Status: He is alert. Labs & Results:  Lab Results   Component Value Date    SODIUM 140 06/30/2023    K 3.8 06/30/2023     06/30/2023    CO2 25 06/30/2023    BUN 19 06/30/2023    CREATININE 1.02 06/30/2023    GLUC 133 06/30/2023    CALCIUM 9.3 06/30/2023       Thank you for the opportunity to participate in the care of this patient.     Maury Monson MD, [facility] [place]  Advanced Heart Failure and Transplant Cardiologist  Director of [address] [place] [address]

## 2023-12-06 DIAGNOSIS — I10 PRIMARY HYPERTENSION: ICD-10-CM

## 2023-12-06 DIAGNOSIS — I49.3 PVC (PREMATURE VENTRICULAR CONTRACTION): ICD-10-CM

## 2023-12-06 RX ORDER — MELOXICAM 15 MG/1
TABLET ORAL
COMMUNITY

## 2023-12-06 RX ORDER — OSELTAMIVIR PHOSPHATE 75 MG/1
CAPSULE ORAL
COMMUNITY

## 2023-12-06 RX ORDER — NAPROXEN 500 MG/1
TABLET ORAL
COMMUNITY

## 2023-12-06 RX ORDER — METOPROLOL SUCCINATE 25 MG/1
25 TABLET, EXTENDED RELEASE ORAL DAILY
Qty: 90 TABLET | Refills: 3 | Status: SHIPPED | OUTPATIENT
Start: 2023-12-06

## 2023-12-06 RX ORDER — PREDNISONE 5 MG/1
TABLET ORAL
COMMUNITY

## 2023-12-06 RX ORDER — TRAMADOL HYDROCHLORIDE 50 MG/1
TABLET ORAL
COMMUNITY

## 2023-12-06 RX ORDER — DIAZEPAM 5 MG/1
TABLET ORAL
COMMUNITY

## 2023-12-08 ENCOUNTER — HOSPITAL ENCOUNTER (OUTPATIENT)
Dept: NON INVASIVE DIAGNOSTICS | Facility: CLINIC | Age: 68
Discharge: HOME | End: 2023-12-08
Payer: COMMERCIAL

## 2023-12-08 VITALS
WEIGHT: 199.96 LBS | BODY MASS INDEX: 30.31 KG/M2 | HEIGHT: 68 IN | SYSTOLIC BLOOD PRESSURE: 136 MMHG | HEART RATE: 74 BPM | DIASTOLIC BLOOD PRESSURE: 78 MMHG

## 2023-12-08 DIAGNOSIS — I35.1 NONRHEUMATIC AORTIC VALVE INSUFFICIENCY: ICD-10-CM

## 2023-12-08 DIAGNOSIS — I49.3 PVC (PREMATURE VENTRICULAR CONTRACTION): ICD-10-CM

## 2023-12-08 LAB
AORTIC ROOT: 3.4 CM
APICAL FOUR CHAMBER EJECTION FRACTION: 65 %
ASCENDING AORTA: 3.2 CM
AV LVOT MEAN GRADIENT: 3 MMHG
AV LVOT PEAK GRADIENT: 6 MMHG
AV REGURGITATION PRESSURE HALF TIME: 759 MS
DOP CALC LVOT PEAK VEL VTI: 25.47 CM
DOP CALC LVOT PEAK VEL: 1.24 M/S
E WAVE DECELERATION TIME: 204 MS
E/A RATIO: 0.8
FRACTIONAL SHORTENING: 44 (ref 28–44)
INTERVENTRICULAR SEPTUM IN DIASTOLE (PARASTERNAL SHORT AXIS VIEW): 1.1 CM
INTERVENTRICULAR SEPTUM: 1.1 CM (ref 0.6–1.1)
LAAS-AP2: 14.5 CM2
LAAS-AP4: 16.2 CM2
LEFT ATRIUM AREA SYSTOLE SINGLE PLANE A4C: 17.3 CM2
LEFT ATRIUM SIZE: 3 CM
LEFT ATRIUM VOLUME (MOD BIPLANE): 42 ML
LEFT ATRIUM VOLUME INDEX (MOD BIPLANE): 20.6 ML/M2
LEFT INTERNAL DIMENSION IN SYSTOLE: 2.2 CM (ref 2.1–4)
LEFT VENTRICULAR INTERNAL DIMENSION IN DIASTOLE: 3.9 CM (ref 3.5–6)
LEFT VENTRICULAR POSTERIOR WALL IN END DIASTOLE: 1.1 CM
LEFT VENTRICULAR STROKE VOLUME: 51 ML
LVSV (TEICH): 51 ML
MV E'TISSUE VEL-SEP: 5 CM/S
MV PEAK A VEL: 0.7 M/S
MV PEAK E VEL: 56 CM/S
MV STENOSIS PRESSURE HALF TIME: 59 MS
MV VALVE AREA P 1/2 METHOD: 3.73
RIGHT ATRIUM AREA SYSTOLE A4C: 11.6 CM2
RIGHT VENTRICLE ID DIMENSION: 3.2 CM
SINOTUBULAR JUNCTION: 2.5 CM
SL CV AV DECELERATION TIME RETROGRADE: 2618 MS
SL CV AV PEAK GRADIENT RETROGRADE: 37 MMHG
SL CV LEFT ATRIUM LENGTH A2C: 4.4 CM
SL CV LV EF: 60
SL CV PED ECHO LEFT VENTRICLE DIASTOLIC VOLUME (MOD BIPLANE) 2D: 67 ML
SL CV PED ECHO LEFT VENTRICLE SYSTOLIC VOLUME (MOD BIPLANE) 2D: 16 ML
SL CV SINUS OF VALSALVA 2D: 3.6 CM
STJ: 2.5 CM
TR MAX PG: 27 MMHG
TR PEAK VELOCITY: 2.6 M/S
TRICUSPID ANNULAR PLANE SYSTOLIC EXCURSION: 2.1 CM
TRICUSPID VALVE PEAK REGURGITATION VELOCITY: 2.59 M/S

## 2023-12-08 PROCEDURE — 93306 TTE W/DOPPLER COMPLETE: CPT

## 2023-12-08 PROCEDURE — 93306 TTE W/DOPPLER COMPLETE: CPT | Performed by: INTERNAL MEDICINE

## 2023-12-08 NOTE — RESULT ENCOUNTER NOTE
Echo - 12/08/23  EF 60%. Mild AI. Trey Frame - Can you let the patient know the echo results. The EF is normal.  There is mild aortic regurgitation which is stable.

## 2023-12-12 ENCOUNTER — TELEPHONE (OUTPATIENT)
Dept: CARDIOLOGY CLINIC | Facility: CLINIC | Age: 68
End: 2023-12-12

## 2023-12-12 ENCOUNTER — TELEPHONE (OUTPATIENT)
Dept: HEMATOLOGY ONCOLOGY | Facility: CLINIC | Age: 68
End: 2023-12-12

## 2023-12-12 NOTE — TELEPHONE ENCOUNTER
LVOM for pt regarding echo results. Pt instructed to call [phone] with any questions or concerns.

## 2023-12-12 NOTE — TELEPHONE ENCOUNTER
----- Message from Haritha Ortiz MD sent at 12/8/2023 12:24 PM EST -----    Echo - 12/08/23  EF 60%. Mild AI. Yamilex Navarro - Can you let the patient know the echo results. The EF is normal.  There is mild aortic regurgitation which is stable.

## 2024-01-13 NOTE — PROGRESS NOTES
Cardiology Clinic Note    Shen Kumari 68 y.o. male   MRN: [id number]  Encounter: [id number]        Assessment / Plan:    # Palpitations  EKG -  sinus rhythm  Ziopatch - rare PAC's and PVC's that correlated with sx's  Started toprol 25mg QD last visit --> some side effects, pt wants to take half dose 12.5 mg daily    # HTN  Mild.   On low dose toprol as above  BP today 116/80.  At goal.    # Borderline prolonged QTc in ER  In ED, the QTc was 460ms on my measurement  Previously normal in 2019  Repeat EKG prior cardiology visit, normal QTc measurement.  No events on ziopatch.    # Mild AI  Stable on recent f/u echo      Today's Plan Summary:  See above assessment/plan for full details of today's plan.  Briefly,     Reduce toprol to 12.5 mg daily            Reason For Visit / Chief Complaint:  F/u palpitations, HTN    HPI:   Shen Kumari is a 68 y.o.  male with history as noted in the problem list and further detailed in the above assessment and plan.    Initial:  August 2023  new patient visit for palpitations.  The patient went to ER in June -- had episode of dizziness, sweating, palpitations.  felt like his heart was going very fast and potentially skipping beats.  In ED - CXR normal, EKG sinus rhythm with borderline QTc, troponin neg.    Patient had reproducible dizziness with quick rotation of head to the left or right (and had hx of vertigo in the past).  Patient given meclizine with resolution of his dizziness.  DC'd to f/u with cardiology given the palpitations episodes.  Today, reports he does get palpitations sometimes.  Rare sx's - every few weeks.  No syncope.   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  Originally from [facility].  .  4 children.    Interval:  Last visit --> reviewed ziopatch results.  No concerning findings.  Paient reported symptoms correlated with sinus, PAC's, PVC's.  Still having palpitation sx's.     Plan last visit -->   Trial toprol (for palpitations and for  HTN)  Check echo to f/u on the AI    Echo - 12/08/23  EF 60%.  Mild AI.    Today - thinks metoprolol makes him feel dizzy.  Wants to know if he can take half dose.   Occasional palpitations - not bothersome.  No syncope.  No chest pain.        Cardiac Imaging personally reviewed:  EKG 6-30-23  Sinus rhythm.  QTc 460ms.    8-28-23  NSR.  QTc 439ms.       Holter or event monitor Zio patch - Sept 2023  Predominant underlying rhythm was Sinus Rhythm.   min HR of 47 bpm, max HR of 149 bpm, and avg HR of 74 bpm.   Rare PAC's and PVC's (less than 1%)  2 brief atrial runs (longest 6 beats)  Paient reported symptoms correlated with sinus, PAC's, PVC's.         Echo 2014  EF 60%.  RV mildly dilated.  Mild AI    Echo - 12/08/23  EF 60%.  Mild AI.     JOVANNY    Cardiac MRI    Stress testing    [facility]    CP[facility]              Patient Active Problem List    Diagnosis Date Noted   • Primary hypertension 10/30/2023   • PVC (premature ventricular contraction) 10/30/2023   • PAC (premature atrial contraction) 10/30/2023   • Vertigo 08/28/2023   • Palpitations 08/28/2023   • Hematospermia 07/05/2018       Past Medical History:   Diagnosis Date   • Herniated disc, cervical    • Vertigo 8/28/2023       Allergies   Allergen Reactions   • Clotrimazole Rash       Current Outpatient Medications   Medication Instructions   • metoprolol succinate (TOPROL-XL) 12.5 mg, Oral, Daily       Social History     Socioeconomic History   • Marital status: /Civil Union     Spouse name: Not on file   • Number of children: Not on file   • Years of education: Not on file   • Highest education level: Not on file   Occupational History   • Occupation:    Tobacco Use   • Smoking status: Former   • Smokeless tobacco: Never   Vaping Use   • Vaping status: Never Used   Substance and Sexual Activity   • Alcohol use: Not Currently     Comment: beer on occ   • Drug use: No   • Sexual activity: Not on file   Other Topics Concern   • Not  "on file   Social History Narrative   • Not on file     Social Determinants of Health     Financial Resource Strain: Not on file   Food Insecurity: Not on file   Transportation Needs: Not on file   Physical Activity: Not on file   Stress: Not on file   Social Connections: Not on file   Intimate Partner Violence: Not on file   Housing Stability: Not on file       Family History   Problem Relation Age of Onset   • Diabetes Father        Physical Exam:  Blood pressure 116/80, pulse 71, height 5' 8\" (1.727 m), weight 89.4 kg (197 lb), SpO2 97%.  Body mass index is 29.95 kg/m².  Wt Readings from Last 3 Encounters:   01/15/24 89.4 kg (197 lb)   12/08/23 90.7 kg (199 lb 15.3 oz)   10/30/23 90.7 kg (200 lb)     Physical Exam  Vitals reviewed.   Constitutional:       General: He is not in acute distress.     Appearance: He is not toxic-appearing.   Neck:      Comments: No JVD  Cardiovascular:      Rate and Rhythm: Normal rate and regular rhythm.      Heart sounds: No murmur heard.     No friction rub. No gallop.   Pulmonary:      Breath sounds: Normal breath sounds. No wheezing, rhonchi or rales.   Musculoskeletal:      Comments: No LE edema   Neurological:      Mental Status: He is alert.         Labs & Results:  Lab Results   Component Value Date    SODIUM 140 06/30/2023    K 3.8 06/30/2023     06/30/2023    CO2 25 06/30/2023    BUN 19 06/30/2023    CREATININE 1.02 06/30/2023    GLUC 133 06/30/2023    CALCIUM 9.3 06/30/2023       Thank you for the opportunity to participate in the care of this patient.    Vega Hernandez MD, [facility]  Advanced Heart Failure and Transplant Cardiologist  Director of Cardio-Oncology  [facility]  "

## 2024-01-15 ENCOUNTER — OFFICE VISIT (OUTPATIENT)
Dept: CARDIOLOGY CLINIC | Facility: CLINIC | Age: 69
End: 2024-01-15
Payer: COMMERCIAL

## 2024-01-15 VITALS
WEIGHT: 197 LBS | HEART RATE: 71 BPM | HEIGHT: 68 IN | DIASTOLIC BLOOD PRESSURE: 80 MMHG | OXYGEN SATURATION: 97 % | BODY MASS INDEX: 29.86 KG/M2 | SYSTOLIC BLOOD PRESSURE: 116 MMHG

## 2024-01-15 DIAGNOSIS — R00.2 PALPITATIONS: ICD-10-CM

## 2024-01-15 DIAGNOSIS — I10 PRIMARY HYPERTENSION: Primary | ICD-10-CM

## 2024-01-15 DIAGNOSIS — I35.1 NONRHEUMATIC AORTIC VALVE INSUFFICIENCY: ICD-10-CM

## 2024-01-15 PROCEDURE — 99214 OFFICE O/P EST MOD 30 MIN: CPT | Performed by: INTERNAL MEDICINE

## 2024-01-15 RX ORDER — METOPROLOL SUCCINATE 25 MG/1
12.5 TABLET, EXTENDED RELEASE ORAL DAILY
Qty: 45 TABLET | Refills: 3 | Status: SHIPPED | OUTPATIENT
Start: 2024-01-15

## 2024-04-19 ENCOUNTER — TELEPHONE (OUTPATIENT)
Age: 69
End: 2024-04-19

## 2024-04-19 NOTE — TELEPHONE ENCOUNTER
Patient called to confirm if the procedure was done with [facility] back in 2017. I advised patient that no record shows that it was done with us. He will call his PCP to confirmed and will call us back to schedule

## 2024-04-19 NOTE — TELEPHONE ENCOUNTER
04/19/24  Screened by: Cindy Ba    Referring Provider ]    Pre- Screening:     There is no height or weight on file to calculate BMI.  Has patient been referred for a routine screening Colonoscopy? yes  Is the patient between 45-75 years old? yes      Previous Colonoscopy yes   If yes:    Date: 2017     Facility:     Reason:       Does the patient want to see a Gastroenterologist prior to their procedure OR are they having any GI symptoms?     Has the patient been hospitalized or had abdominal surgery in the past 6 months? no    Does the patient use supplemental oxygen? no    Does the patient take Coumadin, Lovenox, Plavix, Elliquis, Xarelto, or other blood thinning medication? no    Has the patient had a stroke, cardiac event, or stent placed in the past year? no    If patient is between 45yrs - 49yrs, please advise patient that we will have to confirm benefits & coverage with their insurance company for a routine screening colonoscopy.

## 2024-04-19 NOTE — TELEPHONE ENCOUNTER
Scheduled date of colonoscopy (as of today):04/30/2024  Physician performing colonoscopy: Dr Olvera  Location of colonoscopy:[facility]  Bowel prep reviewed with patient:Miralax/dul  Instructions reviewed with patient by:sent via email  Clearances: n/a

## 2024-04-20 ENCOUNTER — APPOINTMENT (OUTPATIENT)
Dept: LAB | Facility: CLINIC | Age: 69
End: 2024-04-20
Payer: COMMERCIAL

## 2024-04-20 DIAGNOSIS — R73.09 IMPAIRED GLUCOSE TOLERANCE TEST: ICD-10-CM

## 2024-04-20 DIAGNOSIS — E78.2 MIXED HYPERLIPIDEMIA: ICD-10-CM

## 2024-04-20 DIAGNOSIS — R35.0 URINARY FREQUENCY: ICD-10-CM

## 2024-04-20 DIAGNOSIS — R53.83 FATIGUE, UNSPECIFIED TYPE: ICD-10-CM

## 2024-04-20 DIAGNOSIS — E55.9 VITAMIN D DEFICIENCY: ICD-10-CM

## 2024-04-20 LAB
25(OH)D3 SERPL-MCNC: 19.3 NG/ML (ref 30–100)
ALBUMIN SERPL BCP-MCNC: 4.1 G/DL (ref 3.5–5)
ALP SERPL-CCNC: 58 U/L (ref 34–104)
ALT SERPL W P-5'-P-CCNC: 17 U/L (ref 7–52)
ANION GAP SERPL CALCULATED.3IONS-SCNC: 5 MMOL/L (ref 4–13)
AST SERPL W P-5'-P-CCNC: 15 U/L (ref 13–39)
BACTERIA UR QL AUTO: ABNORMAL /HPF
BASOPHILS # BLD AUTO: 0.05 THOUSANDS/ÂΜL (ref 0–0.1)
BASOPHILS NFR BLD AUTO: 1 % (ref 0–1)
BILIRUB SERPL-MCNC: 0.49 MG/DL (ref 0.2–1)
BILIRUB UR QL STRIP: NEGATIVE
BUN SERPL-MCNC: 20 MG/DL (ref 5–25)
CALCIUM SERPL-MCNC: 9 MG/DL (ref 8.4–10.2)
CHLORIDE SERPL-SCNC: 108 MMOL/L (ref 96–108)
CHOLEST SERPL-MCNC: 174 MG/DL
CLARITY UR: CLEAR
CO2 SERPL-SCNC: 27 MMOL/L (ref 21–32)
COLOR UR: YELLOW
CREAT SERPL-MCNC: 1.01 MG/DL (ref 0.6–1.3)
EOSINOPHIL # BLD AUTO: 0.25 THOUSAND/ÂΜL (ref 0–0.61)
EOSINOPHIL NFR BLD AUTO: 4 % (ref 0–6)
ERYTHROCYTE [DISTWIDTH] IN BLOOD BY AUTOMATED COUNT: 12.9 % (ref 11.6–15.1)
ERYTHROCYTE [SEDIMENTATION RATE] IN BLOOD: 2 MM/HOUR (ref 0–19)
EST. AVERAGE GLUCOSE BLD GHB EST-MCNC: 123 MG/DL
GFR SERPL CREATININE-BSD FRML MDRD: 76 ML/MIN/1.73SQ M
GLUCOSE P FAST SERPL-MCNC: 108 MG/DL (ref 65–99)
GLUCOSE UR STRIP-MCNC: NEGATIVE MG/DL
HBA1C MFR BLD: 5.9 %
HCT VFR BLD AUTO: 47 % (ref 36.5–49.3)
HDLC SERPL-MCNC: 35 MG/DL
HGB BLD-MCNC: 16.3 G/DL (ref 12–17)
HGB UR QL STRIP.AUTO: ABNORMAL
IMM GRANULOCYTES # BLD AUTO: 0.02 THOUSAND/UL (ref 0–0.2)
IMM GRANULOCYTES NFR BLD AUTO: 0 % (ref 0–2)
KETONES UR STRIP-MCNC: NEGATIVE MG/DL
LDLC SERPL CALC-MCNC: 112 MG/DL (ref 0–100)
LEUKOCYTE ESTERASE UR QL STRIP: NEGATIVE
LYMPHOCYTES # BLD AUTO: 1.91 THOUSANDS/ÂΜL (ref 0.6–4.47)
LYMPHOCYTES NFR BLD AUTO: 32 % (ref 14–44)
MCH RBC QN AUTO: 29.9 PG (ref 26.8–34.3)
MCHC RBC AUTO-ENTMCNC: 34.7 G/DL (ref 31.4–37.4)
MCV RBC AUTO: 86 FL (ref 82–98)
MONOCYTES # BLD AUTO: 0.51 THOUSAND/ÂΜL (ref 0.17–1.22)
MONOCYTES NFR BLD AUTO: 9 % (ref 4–12)
MUCOUS THREADS UR QL AUTO: ABNORMAL
NEUTROPHILS # BLD AUTO: 3.15 THOUSANDS/ÂΜL (ref 1.85–7.62)
NEUTS SEG NFR BLD AUTO: 54 % (ref 43–75)
NITRITE UR QL STRIP: NEGATIVE
NON-SQ EPI CELLS URNS QL MICRO: ABNORMAL /HPF
NONHDLC SERPL-MCNC: 139 MG/DL
NRBC BLD AUTO-RTO: 0 /100 WBCS
PH UR STRIP.AUTO: 5.5 [PH]
PLATELET # BLD AUTO: 200 THOUSANDS/UL (ref 149–390)
PMV BLD AUTO: 9.8 FL (ref 8.9–12.7)
POTASSIUM SERPL-SCNC: 4.2 MMOL/L (ref 3.5–5.3)
PROT SERPL-MCNC: 6.4 G/DL (ref 6.4–8.4)
PROT UR STRIP-MCNC: NEGATIVE MG/DL
PSA SERPL-MCNC: 0.29 NG/ML (ref 0–4)
RBC # BLD AUTO: 5.45 MILLION/UL (ref 3.88–5.62)
RBC #/AREA URNS AUTO: ABNORMAL /HPF
SODIUM SERPL-SCNC: 140 MMOL/L (ref 135–147)
SP GR UR STRIP.AUTO: 1.03 (ref 1–1.03)
TRIGL SERPL-MCNC: 135 MG/DL
TSH SERPL DL<=0.05 MIU/L-ACNC: 3.5 UIU/ML (ref 0.45–4.5)
UROBILINOGEN UR STRIP-ACNC: <2 MG/DL
WBC # BLD AUTO: 5.89 THOUSAND/UL (ref 4.31–10.16)
WBC #/AREA URNS AUTO: ABNORMAL /HPF

## 2024-04-20 PROCEDURE — 85025 COMPLETE CBC W/AUTO DIFF WBC: CPT

## 2024-04-20 PROCEDURE — 85652 RBC SED RATE AUTOMATED: CPT

## 2024-04-20 PROCEDURE — G0103 PSA SCREENING: HCPCS

## 2024-04-20 PROCEDURE — 82306 VITAMIN D 25 HYDROXY: CPT

## 2024-04-20 PROCEDURE — 80053 COMPREHEN METABOLIC PANEL: CPT

## 2024-04-20 PROCEDURE — 84443 ASSAY THYROID STIM HORMONE: CPT

## 2024-04-20 PROCEDURE — 81001 URINALYSIS AUTO W/SCOPE: CPT

## 2024-04-20 PROCEDURE — 80061 LIPID PANEL: CPT

## 2024-04-20 PROCEDURE — 83036 HEMOGLOBIN GLYCOSYLATED A1C: CPT

## 2024-04-20 PROCEDURE — 36415 COLL VENOUS BLD VENIPUNCTURE: CPT

## 2024-04-22 ENCOUNTER — HOSPITAL ENCOUNTER (OUTPATIENT)
Dept: ULTRASOUND IMAGING | Facility: HOSPITAL | Age: 69
Discharge: HOME | End: 2024-04-22
Payer: COMMERCIAL

## 2024-04-22 DIAGNOSIS — R10.12 LEFT UPPER QUADRANT PAIN: ICD-10-CM

## 2024-04-22 PROCEDURE — 76700 US EXAM ABDOM COMPLETE: CPT

## 2024-04-29 RX ORDER — SODIUM CHLORIDE, SODIUM LACTATE, POTASSIUM CHLORIDE, CALCIUM CHLORIDE 600; 310; 30; 20 MG/100ML; MG/100ML; MG/100ML; MG/100ML
125 INJECTION, SOLUTION INTRAVENOUS CONTINUOUS
Status: CANCELLED | OUTPATIENT
Start: 2024-04-29

## 2024-04-29 RX ORDER — LIDOCAINE HYDROCHLORIDE 10 MG/ML
0.5 INJECTION, SOLUTION EPIDURAL; INFILTRATION; INTRACAUDAL; PERINEURAL ONCE AS NEEDED
Status: CANCELLED | OUTPATIENT
Start: 2024-04-29

## 2024-04-30 ENCOUNTER — ANESTHESIA EVENT (OUTPATIENT)
Dept: GASTROENTEROLOGY | Facility: HOSPITAL | Age: 69
End: 2024-04-30

## 2024-04-30 ENCOUNTER — ANESTHESIA (OUTPATIENT)
Dept: GASTROENTEROLOGY | Facility: HOSPITAL | Age: 69
End: 2024-04-30

## 2024-04-30 ENCOUNTER — HOSPITAL ENCOUNTER (OUTPATIENT)
Dept: GASTROENTEROLOGY | Facility: HOSPITAL | Age: 69
Setting detail: OUTPATIENT SURGERY
Discharge: HOME | End: 2024-04-30
Attending: INTERNAL MEDICINE | Admitting: INTERNAL MEDICINE
Payer: COMMERCIAL

## 2024-04-30 VITALS
TEMPERATURE: 98.8 F | SYSTOLIC BLOOD PRESSURE: 105 MMHG | RESPIRATION RATE: 13 BRPM | WEIGHT: 197.2 LBS | HEIGHT: 68 IN | DIASTOLIC BLOOD PRESSURE: 72 MMHG | BODY MASS INDEX: 29.89 KG/M2 | OXYGEN SATURATION: 94 % | HEART RATE: 79 BPM

## 2024-04-30 DIAGNOSIS — Z86.010 HISTORY OF COLON POLYPS: ICD-10-CM

## 2024-04-30 PROCEDURE — 45385 COLONOSCOPY W/LESION REMOVAL: CPT | Performed by: INTERNAL MEDICINE

## 2024-04-30 PROCEDURE — 88305 TISSUE EXAM BY PATHOLOGIST: CPT | Performed by: PATHOLOGY

## 2024-04-30 RX ORDER — PROPOFOL 10 MG/ML
INJECTION, EMULSION INTRAVENOUS AS NEEDED
Status: DISCONTINUED | OUTPATIENT
Start: 2024-04-30 | End: 2024-04-30

## 2024-04-30 RX ORDER — SODIUM CHLORIDE, SODIUM LACTATE, POTASSIUM CHLORIDE, CALCIUM CHLORIDE 600; 310; 30; 20 MG/100ML; MG/100ML; MG/100ML; MG/100ML
INJECTION, SOLUTION INTRAVENOUS CONTINUOUS PRN
Status: DISCONTINUED | OUTPATIENT
Start: 2024-04-30 | End: 2024-04-30

## 2024-04-30 RX ORDER — LIDOCAINE HYDROCHLORIDE 10 MG/ML
INJECTION, SOLUTION EPIDURAL; INFILTRATION; INTRACAUDAL; PERINEURAL AS NEEDED
Status: DISCONTINUED | OUTPATIENT
Start: 2024-04-30 | End: 2024-04-30

## 2024-04-30 RX ORDER — LIDOCAINE HYDROCHLORIDE 10 MG/ML
0.5 INJECTION, SOLUTION EPIDURAL; INFILTRATION; INTRACAUDAL; PERINEURAL ONCE AS NEEDED
Status: DISCONTINUED | OUTPATIENT
Start: 2024-04-30 | End: 2024-05-04 | Stop reason: HOSPADM

## 2024-04-30 RX ORDER — PROPOFOL 10 MG/ML
INJECTION, EMULSION INTRAVENOUS CONTINUOUS PRN
Status: DISCONTINUED | OUTPATIENT
Start: 2024-04-30 | End: 2024-04-30

## 2024-04-30 RX ORDER — SODIUM CHLORIDE, SODIUM LACTATE, POTASSIUM CHLORIDE, CALCIUM CHLORIDE 600; 310; 30; 20 MG/100ML; MG/100ML; MG/100ML; MG/100ML
125 INJECTION, SOLUTION INTRAVENOUS CONTINUOUS
Status: DISCONTINUED | OUTPATIENT
Start: 2024-04-30 | End: 2024-05-04 | Stop reason: HOSPADM

## 2024-04-30 RX ADMIN — PROPOFOL 100 MG: 10 INJECTION, EMULSION INTRAVENOUS at 10:33

## 2024-04-30 RX ADMIN — PROPOFOL 130 MCG/KG/MIN: 10 INJECTION, EMULSION INTRAVENOUS at 10:33

## 2024-04-30 RX ADMIN — SODIUM CHLORIDE, SODIUM LACTATE, POTASSIUM CHLORIDE, AND CALCIUM CHLORIDE: .6; .31; .03; .02 INJECTION, SOLUTION INTRAVENOUS at 10:14

## 2024-04-30 RX ADMIN — LIDOCAINE HYDROCHLORIDE 50 MG: 10 INJECTION, SOLUTION EPIDURAL; INFILTRATION; INTRACAUDAL at 10:33

## 2024-04-30 NOTE — ANESTHESIA PREPROCEDURE EVALUATION
Procedure:  COLONOSCOPY    Relevant Problems   CARDIO   (+) PAC (premature atrial contraction)   (+) PVC (premature ventricular contraction)   (+) Primary hypertension        Physical Exam    Airway    Mallampati score: II  TM Distance: >3 FB  Neck ROM: full     Dental       Cardiovascular  Rhythm: regular, Rate: normal, Cardiovascular exam normal    Pulmonary  Pulmonary exam normal Breath sounds clear to auscultation    Other Findings        Anesthesia Plan  ASA Score- 2     Anesthesia Type- IV sedation with anesthesia with ASA Monitors.         Additional Monitors:     Airway Plan:     Comment: Discussed risks/benefits, including medication reactions, awareness, aspiration, and serious/life threatening complications.    Plan to maintain native airway with IVGA, monitored with EtCO2.       Plan Factors-Exercise tolerance (METS): >4 METS.    Chart reviewed.    Patient summary reviewed.      Patient instructed to abstain from smoking on day of procedure. Patient did not smoke on day of surgery.            Induction- intravenous.    Postoperative Plan-     Informed Consent- Anesthetic plan and risks discussed with patient.  I personally reviewed this patient with the CRNA. Discussed and agreed on the Anesthesia Plan with the CRNA..

## 2024-04-30 NOTE — ANESTHESIA POSTPROCEDURE EVALUATION
Post-Op Assessment Note    CV Status:  Stable  Pain Score: 0    Pain management: adequate       Mental Status:  Sleepy   Hydration Status:  Euvolemic   PONV Controlled:  Controlled   Airway Patency:  Patent     Post Op Vitals Reviewed: Yes    No anethesia notable event occurred.    Staff: Anesthesiologist, CRNA               BP 99/64 (04/30/24 1045)    Temp      Pulse 73 (04/30/24 1045)   Resp 12 (04/30/24 1045)    SpO2 95 % (04/30/24 1045)

## 2024-04-30 NOTE — H&P
History and Physical - [facility]  Shen Kumari 68 y.o. male MRN: [id number]                  HPI: Shen Kumari is a 68 y.o. year old male who presents for history of polyp      REVIEW OF SYSTEMS: Per the HPI, and otherwise unremarkable.    Historical Information   Past Medical History:   Diagnosis Date    Herniated disc, cervical     Vertigo 8/28/2023     Past Surgical History:   Procedure Laterality Date    SHOULDER SURGERY       Social History   Social History     Substance and Sexual Activity   Alcohol Use Not Currently    Comment: beer on occ     Social History     Substance and Sexual Activity   Drug Use No     Social History     Tobacco Use   Smoking Status Former   Smokeless Tobacco Never     Family History   Problem Relation Age of Onset    Diabetes Father        Meds/Allergies       Current Outpatient Medications:     metoprolol succinate (TOPROL-XL) 25 mg 24 hr tablet    Current Facility-Administered Medications:     lactated ringers infusion, 125 mL/hr, Intravenous, Continuous    lidocaine (PF) (XYLOCAINE-MPF) 1 % injection 0.5 mL, 0.5 mL, Infiltration, Once PRN    Allergies   Allergen Reactions    Clotrimazole Rash       Objective     There were no vitals taken for this visit.      PHYSICAL EXAM    Gen: NAD  Head: NCAT  CV: RRR  CHEST: Clear  ABD: soft, NT/ND  EXT: no edema      ASSESSMENT/PLAN:  This is a 68 y.o. year old male here for colonoscopy, and he is stable and optimized for his procedure.

## 2024-05-02 PROCEDURE — 88305 TISSUE EXAM BY PATHOLOGIST: CPT | Performed by: PATHOLOGY

## 2025-03-07 DIAGNOSIS — I10 PRIMARY HYPERTENSION: ICD-10-CM

## 2025-03-07 RX ORDER — METOPROLOL SUCCINATE 25 MG/1
12.5 TABLET, EXTENDED RELEASE ORAL DAILY
Qty: 45 TABLET | Refills: 3 | Status: SHIPPED | OUTPATIENT
Start: 2025-03-07